# Patient Record
Sex: FEMALE
[De-identification: names, ages, dates, MRNs, and addresses within clinical notes are randomized per-mention and may not be internally consistent; named-entity substitution may affect disease eponyms.]

---

## 2020-03-02 PROBLEM — Z00.00 ENCOUNTER FOR PREVENTIVE HEALTH EXAMINATION: Status: ACTIVE | Noted: 2020-03-02

## 2020-03-05 ENCOUNTER — APPOINTMENT (OUTPATIENT)
Dept: OTOLARYNGOLOGY | Facility: CLINIC | Age: 35
End: 2020-03-05
Payer: COMMERCIAL

## 2020-03-05 VITALS
SYSTOLIC BLOOD PRESSURE: 114 MMHG | HEIGHT: 66 IN | WEIGHT: 160 LBS | BODY MASS INDEX: 25.71 KG/M2 | DIASTOLIC BLOOD PRESSURE: 78 MMHG

## 2020-03-05 DIAGNOSIS — E07.9 DISORDER OF THYROID, UNSPECIFIED: ICD-10-CM

## 2020-03-05 DIAGNOSIS — R07.0 PAIN IN THROAT: ICD-10-CM

## 2020-03-05 DIAGNOSIS — Z78.9 OTHER SPECIFIED HEALTH STATUS: ICD-10-CM

## 2020-03-05 PROCEDURE — 31575 DIAGNOSTIC LARYNGOSCOPY: CPT

## 2020-03-05 PROCEDURE — 99203 OFFICE O/P NEW LOW 30 MIN: CPT | Mod: 25

## 2020-03-05 NOTE — HISTORY OF PRESENT ILLNESS
[de-identified] : Patient presents today with frequent episodes of tonsil stones. Patient admits this has been going on for over a year. She states 2018 she saw another ENT and was told to gargle. Patient was later scanned by her primary and found to papillary thyroid cancer. Had thyroidectomy in 2019. Still frequently gets the stones. Leads to a cough at times. Gargles were helpful. Pt then coughs and tries to get them out.

## 2023-08-03 ENCOUNTER — APPOINTMENT (OUTPATIENT)
Dept: OTOLARYNGOLOGY | Facility: CLINIC | Age: 38
End: 2023-08-03
Payer: COMMERCIAL

## 2023-08-03 VITALS — WEIGHT: 165 LBS | BODY MASS INDEX: 26.63 KG/M2

## 2023-08-03 DIAGNOSIS — K13.0 DISEASES OF LIPS: ICD-10-CM

## 2023-08-03 DIAGNOSIS — J35.01 CHRONIC TONSILLITIS: ICD-10-CM

## 2023-08-03 DIAGNOSIS — J35.8 OTHER CHRONIC DISEASES OF TONSILS AND ADENOIDS: ICD-10-CM

## 2023-08-03 DIAGNOSIS — H93.8X9 OTHER SPECIFIED DISORDERS OF EAR, UNSPECIFIED EAR: ICD-10-CM

## 2023-08-03 PROCEDURE — 31575 DIAGNOSTIC LARYNGOSCOPY: CPT

## 2023-08-03 PROCEDURE — 99204 OFFICE O/P NEW MOD 45 MIN: CPT | Mod: 25

## 2023-08-03 RX ORDER — MOMETASONE FUROATE 1 MG/G
0.1 OINTMENT TOPICAL TWICE DAILY
Qty: 1 | Refills: 2 | Status: ACTIVE | COMMUNITY
Start: 2023-08-03 | End: 1900-01-01

## 2023-08-03 NOTE — PHYSICAL EXAM
[Normal] : mucosa is normal [Midline] : trachea located in midline position [de-identified] : Tympanogram for both ears Type A  [de-identified] : has small cuts on the sides of mouth

## 2023-08-03 NOTE — ASSESSMENT
[FreeTextEntry1] : Pt would like to proceed with tonsillectomy. Risks, benefits and alternatives were explained to the patient. They included but were not limited to bleeding, infection, persistent symptoms, dehydration, numbness, change in voice, change in swallowing, need for additional surgery, etc.. All questions were answered at this time.

## 2023-08-03 NOTE — HISTORY OF PRESENT ILLNESS
[de-identified] : Patient presents today c/o tonsil stones, clogged ears,  cuts in  corner of mouth .  Patient has a history of tonsil stones for a few years. Last 2-3 months she was having frequent tonsils stones.  She gets them frequently on the left side of her throat. Denies sore throat or any recent infection. Has not been keeping up with salt water gargles. Has small cuts on the sides of her mouth for the last 2 months. Has been using Vaseline but not completely healing. Denies any hearing loss or drainage from the ears. States that she feels a fullness of in the ears.

## 2023-08-03 NOTE — PROCEDURE
[Lesion] : lesion identified by mirror examination needing further evaluation [Flexible Endoscope] : examined with the flexible endoscope [Normal] : the false vocal folds were pink and regular, the ventricular sulcus was open, the true vocal folds were glistening white, tense and of equal length, mobility, and height [de-identified] : pharyngitis

## 2023-08-03 NOTE — REASON FOR VISIT
[Initial Evaluation] : an initial evaluation for [FreeTextEntry2] : tonsil stones, clogged ears,  cuts in  corner of mouth

## 2025-07-14 ENCOUNTER — APPOINTMENT (OUTPATIENT)
Dept: OTOLARYNGOLOGY | Facility: CLINIC | Age: 40
End: 2025-07-14
Payer: COMMERCIAL

## 2025-07-14 VITALS — HEIGHT: 66 IN | WEIGHT: 178.25 LBS | BODY MASS INDEX: 28.65 KG/M2

## 2025-07-14 PROCEDURE — 99214 OFFICE O/P EST MOD 30 MIN: CPT | Mod: 25

## 2025-07-14 PROCEDURE — 31231 NASAL ENDOSCOPY DX: CPT

## 2025-07-14 RX ORDER — LEVOCETIRIZINE DIHYDROCHLORIDE 5 MG/1
5 TABLET ORAL
Qty: 30 | Refills: 3 | Status: ACTIVE | COMMUNITY
Start: 2025-07-14 | End: 1900-01-01

## 2025-07-14 RX ORDER — LEVOFLOXACIN 500 MG/1
500 TABLET, FILM COATED ORAL DAILY
Qty: 14 | Refills: 0 | Status: ACTIVE | COMMUNITY
Start: 2025-07-14 | End: 1900-01-01

## 2025-07-14 RX ORDER — AZELASTINE HYDROCHLORIDE 137 UG/1
0.1 SPRAY, METERED NASAL DAILY
Qty: 1 | Refills: 10 | Status: ACTIVE | COMMUNITY
Start: 2025-07-14 | End: 1900-01-01

## 2025-07-15 ENCOUNTER — RX RENEWAL (OUTPATIENT)
Age: 40
End: 2025-07-15

## 2025-07-15 RX ORDER — FAMOTIDINE 40 MG/1
40 TABLET, FILM COATED ORAL
Qty: 90 | Refills: 0 | Status: ACTIVE | COMMUNITY
Start: 2025-07-14 | End: 1900-01-01

## 2025-07-16 ENCOUNTER — RX RENEWAL (OUTPATIENT)
Age: 40
End: 2025-07-16

## 2025-07-16 RX ORDER — FLUTICASONE PROPIONATE 50 UG/1
50 SPRAY NASAL DAILY
Qty: 48 | Refills: 0 | Status: ACTIVE | COMMUNITY
Start: 2025-07-14 | End: 1900-01-01

## 2025-08-07 ENCOUNTER — APPOINTMENT (OUTPATIENT)
Dept: OTOLARYNGOLOGY | Facility: CLINIC | Age: 40
End: 2025-08-07
Payer: COMMERCIAL

## 2025-08-07 DIAGNOSIS — R09.81 NASAL CONGESTION: ICD-10-CM

## 2025-08-07 DIAGNOSIS — R09.89 OTHER SPECIFIED SYMPTOMS AND SIGNS INVOLVING THE CIRCULATORY AND RESPIRATORY SYSTEMS: ICD-10-CM

## 2025-08-07 DIAGNOSIS — J32.9 CHRONIC SINUSITIS, UNSPECIFIED: ICD-10-CM

## 2025-08-07 PROCEDURE — 31231 NASAL ENDOSCOPY DX: CPT

## 2025-08-07 PROCEDURE — 99213 OFFICE O/P EST LOW 20 MIN: CPT | Mod: 25
